# Patient Record
Sex: FEMALE | Race: WHITE | NOT HISPANIC OR LATINO
[De-identification: names, ages, dates, MRNs, and addresses within clinical notes are randomized per-mention and may not be internally consistent; named-entity substitution may affect disease eponyms.]

---

## 2019-07-31 PROBLEM — Z00.00 ENCOUNTER FOR PREVENTIVE HEALTH EXAMINATION: Status: ACTIVE | Noted: 2019-07-31

## 2019-08-05 ENCOUNTER — APPOINTMENT (OUTPATIENT)
Dept: PSYCHIATRY | Facility: CLINIC | Age: 59
End: 2019-08-05
Payer: MEDICARE

## 2019-08-05 PROCEDURE — 90792 PSYCH DIAG EVAL W/MED SRVCS: CPT

## 2019-08-22 ENCOUNTER — APPOINTMENT (OUTPATIENT)
Dept: PSYCHIATRY | Facility: CLINIC | Age: 59
End: 2019-08-22
Payer: MEDICARE

## 2019-08-22 PROCEDURE — 99215 OFFICE O/P EST HI 40 MIN: CPT

## 2019-09-25 ENCOUNTER — APPOINTMENT (OUTPATIENT)
Dept: PSYCHIATRY | Facility: CLINIC | Age: 59
End: 2019-09-25
Payer: MEDICARE

## 2019-09-25 PROCEDURE — 99215 OFFICE O/P EST HI 40 MIN: CPT

## 2019-11-01 ENCOUNTER — APPOINTMENT (OUTPATIENT)
Dept: PSYCHIATRY | Facility: CLINIC | Age: 59
End: 2019-11-01

## 2020-01-23 ENCOUNTER — FORM ENCOUNTER (OUTPATIENT)
Age: 60
End: 2020-01-23

## 2020-01-24 ENCOUNTER — APPOINTMENT (OUTPATIENT)
Dept: PSYCHIATRY | Facility: CLINIC | Age: 60
End: 2020-01-24

## 2020-02-25 ENCOUNTER — APPOINTMENT (OUTPATIENT)
Dept: PSYCHIATRY | Facility: CLINIC | Age: 60
End: 2020-02-25

## 2020-04-01 ENCOUNTER — OUTPATIENT (OUTPATIENT)
Dept: OUTPATIENT SERVICES | Facility: HOSPITAL | Age: 60
LOS: 1 days | Discharge: HOME | End: 2020-04-01
Payer: MEDICARE

## 2020-04-17 ENCOUNTER — APPOINTMENT (OUTPATIENT)
Dept: PSYCHIATRY | Facility: CLINIC | Age: 60
End: 2020-04-17

## 2020-04-23 ENCOUNTER — FORM ENCOUNTER (OUTPATIENT)
Age: 60
End: 2020-04-23

## 2020-04-24 ENCOUNTER — APPOINTMENT (OUTPATIENT)
Dept: PSYCHIATRY | Facility: CLINIC | Age: 60
End: 2020-04-24

## 2020-06-18 ENCOUNTER — FORM ENCOUNTER (OUTPATIENT)
Age: 60
End: 2020-06-18

## 2020-06-18 DIAGNOSIS — F20.9 SCHIZOPHRENIA, UNSPECIFIED: ICD-10-CM

## 2020-06-19 ENCOUNTER — APPOINTMENT (OUTPATIENT)
Dept: PSYCHIATRY | Facility: CLINIC | Age: 60
End: 2020-06-19

## 2020-06-19 PROCEDURE — 99214 OFFICE O/P EST MOD 30 MIN: CPT | Mod: 95

## 2020-07-05 ENCOUNTER — FORM ENCOUNTER (OUTPATIENT)
Age: 60
End: 2020-07-05

## 2020-07-06 ENCOUNTER — APPOINTMENT (OUTPATIENT)
Dept: PSYCHIATRY | Facility: CLINIC | Age: 60
End: 2020-07-06

## 2020-07-06 PROCEDURE — 99215 OFFICE O/P EST HI 40 MIN: CPT | Mod: 95

## 2020-07-07 ENCOUNTER — FORM ENCOUNTER (OUTPATIENT)
Age: 60
End: 2020-07-07

## 2020-07-08 ENCOUNTER — APPOINTMENT (OUTPATIENT)
Dept: PSYCHIATRY | Facility: CLINIC | Age: 60
End: 2020-07-08

## 2020-07-08 PROCEDURE — 99214 OFFICE O/P EST MOD 30 MIN: CPT | Mod: 95

## 2020-07-21 ENCOUNTER — FORM ENCOUNTER (OUTPATIENT)
Age: 60
End: 2020-07-21

## 2020-07-22 ENCOUNTER — APPOINTMENT (OUTPATIENT)
Dept: PSYCHIATRY | Facility: CLINIC | Age: 60
End: 2020-07-22

## 2020-07-22 PROCEDURE — 99214 OFFICE O/P EST MOD 30 MIN: CPT | Mod: 95

## 2020-08-06 ENCOUNTER — FORM ENCOUNTER (OUTPATIENT)
Age: 60
End: 2020-08-06

## 2020-08-07 ENCOUNTER — APPOINTMENT (OUTPATIENT)
Dept: PSYCHIATRY | Facility: CLINIC | Age: 60
End: 2020-08-07

## 2020-08-07 PROCEDURE — 99214 OFFICE O/P EST MOD 30 MIN: CPT | Mod: 95

## 2020-09-03 ENCOUNTER — FORM ENCOUNTER (OUTPATIENT)
Age: 60
End: 2020-09-03

## 2020-09-04 ENCOUNTER — APPOINTMENT (OUTPATIENT)
Dept: PSYCHIATRY | Facility: CLINIC | Age: 60
End: 2020-09-04

## 2020-09-04 PROCEDURE — 99214 OFFICE O/P EST MOD 30 MIN: CPT | Mod: 95

## 2020-09-13 ENCOUNTER — FORM ENCOUNTER (OUTPATIENT)
Age: 60
End: 2020-09-13

## 2020-09-14 ENCOUNTER — APPOINTMENT (OUTPATIENT)
Dept: PSYCHIATRY | Facility: CLINIC | Age: 60
End: 2020-09-14

## 2020-09-14 PROCEDURE — 90853 GROUP PSYCHOTHERAPY: CPT | Mod: 95

## 2020-09-20 ENCOUNTER — FORM ENCOUNTER (OUTPATIENT)
Age: 60
End: 2020-09-20

## 2020-09-21 ENCOUNTER — APPOINTMENT (OUTPATIENT)
Dept: PSYCHIATRY | Facility: CLINIC | Age: 60
End: 2020-09-21

## 2020-09-21 PROCEDURE — 90853 GROUP PSYCHOTHERAPY: CPT | Mod: 95

## 2020-09-28 ENCOUNTER — APPOINTMENT (OUTPATIENT)
Dept: PSYCHIATRY | Facility: CLINIC | Age: 60
End: 2020-09-28

## 2020-10-05 ENCOUNTER — APPOINTMENT (OUTPATIENT)
Dept: PSYCHIATRY | Facility: CLINIC | Age: 60
End: 2020-10-05

## 2020-10-05 ENCOUNTER — FORM ENCOUNTER (OUTPATIENT)
Age: 60
End: 2020-10-05

## 2020-10-06 ENCOUNTER — TRANSCRIPTION ENCOUNTER (OUTPATIENT)
Age: 60
End: 2020-10-06

## 2020-10-06 ENCOUNTER — APPOINTMENT (OUTPATIENT)
Dept: PSYCHIATRY | Facility: CLINIC | Age: 60
End: 2020-10-06

## 2020-10-06 PROCEDURE — 99215 OFFICE O/P EST HI 40 MIN: CPT | Mod: 95

## 2020-10-12 ENCOUNTER — APPOINTMENT (OUTPATIENT)
Dept: PSYCHIATRY | Facility: CLINIC | Age: 60
End: 2020-10-12

## 2020-10-19 ENCOUNTER — APPOINTMENT (OUTPATIENT)
Dept: PSYCHIATRY | Facility: CLINIC | Age: 60
End: 2020-10-19

## 2020-10-22 ENCOUNTER — NON-APPOINTMENT (OUTPATIENT)
Age: 60
End: 2020-10-22

## 2020-10-26 ENCOUNTER — APPOINTMENT (OUTPATIENT)
Dept: PSYCHIATRY | Facility: CLINIC | Age: 60
End: 2020-10-26

## 2020-11-02 ENCOUNTER — APPOINTMENT (OUTPATIENT)
Dept: PSYCHIATRY | Facility: CLINIC | Age: 60
End: 2020-11-02

## 2020-11-09 ENCOUNTER — APPOINTMENT (OUTPATIENT)
Dept: PSYCHIATRY | Facility: CLINIC | Age: 60
End: 2020-11-09

## 2020-11-15 ENCOUNTER — FORM ENCOUNTER (OUTPATIENT)
Age: 60
End: 2020-11-15

## 2020-11-16 ENCOUNTER — APPOINTMENT (OUTPATIENT)
Dept: PSYCHIATRY | Facility: CLINIC | Age: 60
End: 2020-11-16

## 2020-11-23 ENCOUNTER — APPOINTMENT (OUTPATIENT)
Dept: PSYCHIATRY | Facility: CLINIC | Age: 60
End: 2020-11-23

## 2020-11-30 ENCOUNTER — APPOINTMENT (OUTPATIENT)
Dept: PSYCHIATRY | Facility: CLINIC | Age: 60
End: 2020-11-30

## 2020-12-07 ENCOUNTER — APPOINTMENT (OUTPATIENT)
Dept: PSYCHIATRY | Facility: CLINIC | Age: 60
End: 2020-12-07

## 2020-12-10 ENCOUNTER — FORM ENCOUNTER (OUTPATIENT)
Age: 60
End: 2020-12-10

## 2020-12-13 ENCOUNTER — FORM ENCOUNTER (OUTPATIENT)
Age: 60
End: 2020-12-13

## 2020-12-14 ENCOUNTER — APPOINTMENT (OUTPATIENT)
Dept: PSYCHIATRY | Facility: CLINIC | Age: 60
End: 2020-12-14

## 2020-12-21 ENCOUNTER — APPOINTMENT (OUTPATIENT)
Dept: PSYCHIATRY | Facility: CLINIC | Age: 60
End: 2020-12-21

## 2020-12-28 ENCOUNTER — APPOINTMENT (OUTPATIENT)
Dept: PSYCHIATRY | Facility: CLINIC | Age: 60
End: 2020-12-28

## 2020-12-30 ENCOUNTER — NON-APPOINTMENT (OUTPATIENT)
Age: 60
End: 2020-12-30

## 2020-12-30 DIAGNOSIS — I38 ENDOCARDITIS, VALVE UNSPECIFIED: ICD-10-CM

## 2021-01-04 ENCOUNTER — APPOINTMENT (OUTPATIENT)
Dept: PSYCHIATRY | Facility: CLINIC | Age: 61
End: 2021-01-04

## 2021-01-11 ENCOUNTER — APPOINTMENT (OUTPATIENT)
Dept: PSYCHIATRY | Facility: CLINIC | Age: 61
End: 2021-01-11

## 2021-01-25 ENCOUNTER — APPOINTMENT (OUTPATIENT)
Dept: PSYCHIATRY | Facility: CLINIC | Age: 61
End: 2021-01-25

## 2021-02-09 ENCOUNTER — APPOINTMENT (OUTPATIENT)
Dept: PSYCHIATRY | Facility: CLINIC | Age: 61
End: 2021-02-09
Payer: MEDICARE

## 2021-02-09 ENCOUNTER — NON-APPOINTMENT (OUTPATIENT)
Age: 61
End: 2021-02-09

## 2021-02-09 PROCEDURE — 99214 OFFICE O/P EST MOD 30 MIN: CPT | Mod: 95

## 2021-02-09 RX ORDER — QUETIAPINE FUMARATE 200 MG/1
200 TABLET ORAL
Qty: 90 | Refills: 0 | Status: DISCONTINUED | COMMUNITY
Start: 2020-09-04

## 2021-02-09 RX ORDER — BACLOFEN 10 MG/1
10 TABLET ORAL 3 TIMES DAILY
Refills: 0 | Status: DISCONTINUED | COMMUNITY
End: 2021-02-09

## 2021-02-09 RX ORDER — LIDOCAINE AND PRILOCAINE 25; 25 MG/G; MG/G
2.5-2.5 CREAM TOPICAL
Qty: 30 | Refills: 0 | Status: DISCONTINUED | COMMUNITY
Start: 2020-11-09

## 2021-04-05 ENCOUNTER — APPOINTMENT (OUTPATIENT)
Dept: PSYCHIATRY | Facility: CLINIC | Age: 61
End: 2021-04-05
Payer: MEDICARE

## 2021-04-05 DIAGNOSIS — M43.6 TORTICOLLIS: ICD-10-CM

## 2021-04-05 DIAGNOSIS — G25.81 RESTLESS LEGS SYNDROME: ICD-10-CM

## 2021-04-05 PROCEDURE — 99214 OFFICE O/P EST MOD 30 MIN: CPT | Mod: 95

## 2021-04-05 RX ORDER — GABAPENTIN 100 MG/1
100 CAPSULE ORAL
Refills: 0 | Status: ACTIVE | COMMUNITY
Start: 2021-01-06

## 2021-06-01 ENCOUNTER — APPOINTMENT (OUTPATIENT)
Dept: PSYCHIATRY | Facility: CLINIC | Age: 61
End: 2021-06-01
Payer: MEDICARE

## 2021-06-01 VITALS — WEIGHT: 192 LBS | HEIGHT: 68 IN | BODY MASS INDEX: 29.1 KG/M2

## 2021-06-01 DIAGNOSIS — E03.9 HYPOTHYROIDISM, UNSPECIFIED: ICD-10-CM

## 2021-06-01 DIAGNOSIS — E78.5 HYPERLIPIDEMIA, UNSPECIFIED: ICD-10-CM

## 2021-06-01 PROCEDURE — 99214 OFFICE O/P EST MOD 30 MIN: CPT | Mod: 95

## 2021-06-01 RX ORDER — ESZOPICLONE 3 MG/1
3 TABLET, FILM COATED ORAL
Qty: 30 | Refills: 0 | Status: DISCONTINUED | COMMUNITY
Start: 2021-03-24

## 2021-06-01 RX ORDER — ROSUVASTATIN CALCIUM 20 MG/1
20 TABLET, FILM COATED ORAL
Refills: 0 | Status: ACTIVE | COMMUNITY
Start: 2020-08-19

## 2021-06-01 RX ORDER — ESZOPICLONE 2 MG/1
2 TABLET, FILM COATED ORAL
Qty: 30 | Refills: 0 | Status: DISCONTINUED | COMMUNITY
Start: 2021-03-05

## 2021-07-28 ENCOUNTER — APPOINTMENT (OUTPATIENT)
Dept: PSYCHIATRY | Facility: CLINIC | Age: 61
End: 2021-07-28
Payer: MEDICARE

## 2021-07-28 PROCEDURE — 99214 OFFICE O/P EST MOD 30 MIN: CPT | Mod: 95

## 2021-09-21 ENCOUNTER — APPOINTMENT (OUTPATIENT)
Dept: PSYCHIATRY | Facility: CLINIC | Age: 61
End: 2021-09-21
Payer: MEDICARE

## 2021-09-21 VITALS — WEIGHT: 196 LBS | BODY MASS INDEX: 29.8 KG/M2

## 2021-09-21 PROCEDURE — 99214 OFFICE O/P EST MOD 30 MIN: CPT | Mod: 95

## 2021-12-07 ENCOUNTER — APPOINTMENT (OUTPATIENT)
Dept: PSYCHIATRY | Facility: CLINIC | Age: 61
End: 2021-12-07

## 2022-01-04 ENCOUNTER — APPOINTMENT (OUTPATIENT)
Dept: PSYCHIATRY | Facility: CLINIC | Age: 62
End: 2022-01-04

## 2022-01-11 ENCOUNTER — APPOINTMENT (OUTPATIENT)
Dept: PSYCHIATRY | Facility: CLINIC | Age: 62
End: 2022-01-11
Payer: MEDICARE

## 2022-01-11 VITALS — BODY MASS INDEX: 29.86 KG/M2 | HEIGHT: 68 IN | WEIGHT: 197 LBS

## 2022-01-11 PROCEDURE — 99214 OFFICE O/P EST MOD 30 MIN: CPT | Mod: 95

## 2022-03-22 ENCOUNTER — APPOINTMENT (OUTPATIENT)
Dept: PSYCHIATRY | Facility: CLINIC | Age: 62
End: 2022-03-22
Payer: MEDICARE

## 2022-03-22 VITALS — HEIGHT: 68 IN | BODY MASS INDEX: 30.31 KG/M2 | WEIGHT: 200 LBS

## 2022-03-22 PROCEDURE — 99214 OFFICE O/P EST MOD 30 MIN: CPT | Mod: 95

## 2022-06-21 ENCOUNTER — APPOINTMENT (OUTPATIENT)
Dept: PSYCHIATRY | Facility: CLINIC | Age: 62
End: 2022-06-21
Payer: MEDICARE

## 2022-06-21 VITALS — WEIGHT: 195 LBS | BODY MASS INDEX: 29.55 KG/M2 | HEIGHT: 68 IN

## 2022-06-21 PROCEDURE — 99215 OFFICE O/P EST HI 40 MIN: CPT | Mod: 95

## 2022-06-28 ENCOUNTER — APPOINTMENT (OUTPATIENT)
Dept: PSYCHIATRY | Facility: CLINIC | Age: 62
End: 2022-06-28
Payer: MEDICARE

## 2022-06-28 PROCEDURE — 99215 OFFICE O/P EST HI 40 MIN: CPT | Mod: 95

## 2022-07-07 ENCOUNTER — APPOINTMENT (OUTPATIENT)
Dept: PSYCHIATRY | Facility: CLINIC | Age: 62
End: 2022-07-07

## 2022-07-07 PROCEDURE — 99214 OFFICE O/P EST MOD 30 MIN: CPT

## 2022-07-22 ENCOUNTER — APPOINTMENT (OUTPATIENT)
Dept: PSYCHIATRY | Facility: CLINIC | Age: 62
End: 2022-07-22

## 2022-08-11 ENCOUNTER — NON-APPOINTMENT (OUTPATIENT)
Age: 62
End: 2022-08-11

## 2022-08-11 ENCOUNTER — APPOINTMENT (OUTPATIENT)
Dept: PSYCHIATRY | Facility: CLINIC | Age: 62
End: 2022-08-11

## 2022-08-11 DIAGNOSIS — Z86.59 PERSONAL HISTORY OF OTHER MENTAL AND BEHAVIORAL DISORDERS: ICD-10-CM

## 2022-08-11 PROCEDURE — 99214 OFFICE O/P EST MOD 30 MIN: CPT | Mod: 95

## 2022-09-29 ENCOUNTER — APPOINTMENT (OUTPATIENT)
Dept: PSYCHIATRY | Facility: CLINIC | Age: 62
End: 2022-09-29

## 2022-09-29 VITALS — BODY MASS INDEX: 29.86 KG/M2 | WEIGHT: 197 LBS | HEIGHT: 68 IN

## 2022-09-29 PROCEDURE — 99214 OFFICE O/P EST MOD 30 MIN: CPT | Mod: 95

## 2022-10-27 ENCOUNTER — APPOINTMENT (OUTPATIENT)
Dept: PSYCHIATRY | Facility: CLINIC | Age: 62
End: 2022-10-27

## 2022-12-01 ENCOUNTER — APPOINTMENT (OUTPATIENT)
Dept: PSYCHIATRY | Facility: CLINIC | Age: 62
End: 2022-12-01

## 2022-12-01 VITALS — WEIGHT: 195 LBS | BODY MASS INDEX: 29.55 KG/M2 | HEIGHT: 68 IN

## 2022-12-01 PROCEDURE — 99215 OFFICE O/P EST HI 40 MIN: CPT | Mod: 95

## 2022-12-01 NOTE — PHYSICAL EXAM
[Dysarthria] : dysarthria [Constricted] : constricted [Normal] : good [Fair] : fair [FreeTextEntry1] : severe choreoathetosis in face, neck, shoulders, upper arms and hands. [FreeTextEntry8] : "Ok"

## 2022-12-01 NOTE — HISTORY OF PRESENT ILLNESS
[de-identified] : Past 8 weeks:  "A little bit better."  "No voices in a while."  Feels helped by weekly 1:1w DELROY Gasca via [facility].   Sporadically doing [facility] group on zoom.  No SI.  Met w PCP DO Renita Augustin on 11/28/22 - HA!C was higher but no changes made.  Also met w [facility] endo 11/28/22 and plan is for a "wt loss medicine."  \par  [No] : no

## 2022-12-01 NOTE — DISCUSSION/SUMMARY
[FreeTextEntry1] : 59 yo woman diagnosed with Schizophrenia 15 years ago, when she experienced auditory hallucinations, negative symptoms, and somatic delusions. At this time, she still experiences auditory hallucinations 1-2 times per day, but has worked on ignoring them so they don't cause as much distress. She recognizes her tendency to isolate, so she often makes sure she socializes and gets out of the house (e.g., going to concerts, attending an art group). While she no longer has living family, she does rely on a couple of close friends for emotional support. She denies suicidal and homicidal ideation.  She has good insight into her illness and access to support and coping strategies.  Dramatic reduction in abnormal movements - almost none remain - on higher dose of baclofen.  She is interested in possible ariprazole trial for metabolic benefits.  Sx increased 7/2020 and quetiapine dose inc from 400 to 500 has led to a resolution.  patient decreased quetiapine from 500 to 400 as well as q 2mos visits for financial reasons.  Insisting on qhs zolpidem despite my warnings re dependence.  Used her PCP to obtain these meds despite my asking her not to.  Rediscussed possibility of clozapine to possibly help movement d/o but pt not interested due to frequency of CBC.  We discussed TD meds but she has researched and too expensive.  I provided pt w patient handout from [facility] on treating dry mouth to help combat cavity formation.  I re-suggested clozapine but she is not interested despite possible benefit for TD.  Pt believes that her movement disorder is a reaction to not giving her attention to AH.  Does not think TD has been caused by anti-psychotic medications.\par \par Pt wants 90 day med supplies via mail order and we agreed to meet q 3 months w additional visits prn.\par \par I requested copies of 11/28/22 labs and pt will provide.\par \par Pt declines to come for q 6 mos AIMS as already has severe TD and does not want tx for the TD due to cost.  She has already seen a movement disorder specialist at [facility].\par  \par 12/1/22 BMI is 29.  Currently being considered for wt loss medicine by [facility] endo.  Pt was "encouraged to take quetiapine by a movement d/o specialist".  Was on risperidone for many years prior to this.  Possibly had a different AP when first dx.  Pt is open to a more wt neutral SGA but states needs to be a generic. Not interested in clozapine trial due to CBCs.  We had brief conversation re aripiprazole and pt wants to research this.\par \par \par Plan:\par continue  quetiapine 500 (incr 6/21/22).\par continue zolpidem 5 qhs - *** pt knows that she will not rcv extra meds if uses more than this dose ***\par consider switch to aripiprazole\par next AIMS declined by pt\par next labs 10/2022\par next BMI 12/2022\par next IAP 2/2023\par RT 3 months and prn

## 2022-12-01 NOTE — SOCIAL HISTORY
[FreeTextEntry1] : ***SocHx: completed her TREASURE at [facility].  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  Pt researched [facility] online - pt prefers to socialize with  people not-identified w mental illness.  Patient participates in various activities on own.  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  [No Known Substance Use] : no known substance use

## 2022-12-01 NOTE — RESULTS/DATA
[FreeTextEntry1] : LABS:\par 10/1/21 - ABNL:   WNL: CBC, CMP, lipids, HA1C 5.7;  TSH 0.67\par 1/8/20 -   ABNL:  TSH 4.57  Chol 213,  WNL: CBC, CMP, lipids, HA1C 5.7\par 6/21/19 -   ABNL:  none  WNL: lipid panel, T4, TSH 3.49.  \par 4/1/19 -   ABNL: HA1C 5.8;    WNL: CBC, CMP.    \par \par \par Wt & Vitals: \par wt 11/1/19 = 185 (5 yr range 190 - 205); vitals 11/1/19 - BP = 128/84   P =  80; wt 10/6/20 =192   BMI 10/6/20 = 29.2 (overwt)  (Ht 5'8")

## 2022-12-01 NOTE — REASON FOR VISIT
[Home] : at home, [unfilled] , at the time of the visit. [Medical Office: ([facility])___] : at the medical office located in  [Patient] : the patient [Self] : self [Follow-Up Visit] : a follow-up visit [FreeTextEntry1] : follow up for psychosis and movement disorder

## 2022-12-01 NOTE — PAST MEDICAL HISTORY
[FreeTextEntry1] : PPSyHx:  dx with Schizophrenia 15 years ago, when she experienced auditory hallucinations, negative symptoms, and somatic delusions. During that time she heard voices that said negative things about her. She briefly sought treatment at [facility], but within a few months started treatment at [facility] through their research program for Schizophrenia. There, she received treatment for 13 years where she engaged in individual therapy and medication management. She has never been psychiatrically hospitalized and has responded well to medications. She was previously on Risperidone and is currently taking Seroquel 400 mg. Around 2 years ago, she started to have symptoms of tardive dyskinesia, specifically related to involuntary movements with her neck. Her medication was switched in response to the tardive dyskinesia.  8/13/19 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  Recc  Tx for torticollis is botox.  Plan was to change from artane to baclofen.  Has AH approx 5 % of time she is awake.  "Rumbling under feet" x years.  Thinks tingling under feet and up legs may be a psychotic sx.  Has not talked to neurologists about tingling under feet.  Prior psychiatrist thought it was sx of psychosis.  Had rcvd clonazepam in past and not thought helpful (by her psychiatrist or patient) feeling like having a sz or for "panic attacks".  Psychiatrist [facility] quetiapine.  Pt has not had more than 400 mg/day. Seroquel 400 mg q hs (decr from 500 end of Jan 2021),  Had 2 month trial of metformin from PCP that did not help w wt loss.   Has dry mouth x "several years."  Has been on quetiapine for "2-3 years."  Pt discussed dry mouth and cavities w a dentist in the past.  was given a biotene oral rinse by dentist. \par  \par

## 2023-02-23 ENCOUNTER — APPOINTMENT (OUTPATIENT)
Dept: PSYCHIATRY | Facility: CLINIC | Age: 63
End: 2023-02-23
Payer: MEDICARE

## 2023-02-23 VITALS — WEIGHT: 201 LBS | BODY MASS INDEX: 30.46 KG/M2 | HEIGHT: 68 IN

## 2023-02-23 PROCEDURE — 99215 OFFICE O/P EST HI 40 MIN: CPT

## 2023-02-24 NOTE — SOCIAL HISTORY
[No Known Substance Use] : no known substance use [FreeTextEntry1] : ***SocHx: completed her TREASURE at [facility].  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  Pt researched [facility] online - pt prefers to socialize with  people not-identified w mental illness.  Patient participates in various activities on own.  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

## 2023-02-24 NOTE — SOCIAL HISTORY
[No Known Substance Use] : no known substance use [FreeTextEntry1] : ***SocHx: completed her TREASURE at [facility].  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  Pt researched [facility] online - pt prefers to socialize with  people not-identified w mental illness.  Patient participates in various activities on own.  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

## 2023-02-24 NOTE — HISTORY OF PRESENT ILLNESS
[No] : no [FreeTextEntry1] : Patient seen in person.  Past 12 weeks:  "A little bit better."  TD is "bothersome, body movement when I am trying to have a good time."   Feels helped by weekly 1:1w [facility] via [facility].   Sporadically doing [facility] group on zoom.  No SI.  Has not f/u w endo for wt loss treatment.  Colonoscopy was postponed.  Saw movement d/o specialist several weeks ago - botox was recc.  They discussed meds for TD but she declined partly due to cost.  She has a gyn appt next week - has not had x 15 yrs - "my PCP has been bugging me for years." [FreeTextEntry2] : PPSyHx:  dx with Schizophrenia 15 years ago, when she experienced auditory hallucinations, negative symptoms, and somatic delusions. During that time she heard voices that said negative things about her. She briefly sought treatment at [facility], but within a few months started treatment at [place] through their research program for Schizophrenia. There, she received treatment for 13 years where she engaged in individual therapy and medication management. She has never been psychiatrically hospitalized and has responded well to medications. She was previously on Risperidone and is currently taking Seroquel 400 mg. Around 2 years ago, she started to have symptoms of tardive dyskinesia, specifically related to involuntary movements with her neck. Her medication was switched in response to the tardive dyskinesia.  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  Recc  Tx for torticollis is botox.  Plan was to change from artane to baclofen.  Has AH approx 5 % of time she is awake.  "Rumbling under feet" x years.  Thinks tingling under feet and up legs may be a psychotic sx.  Has not talked to neurologists about tingling under feet.  Prior psychiatrist thought it was sx of psychosis.  Had rcvd clonazepam in past and not thought helpful (by her psychiatrist or patient) feeling like having a sz or for "panic attacks".  Psychiatrist [facility] quetiapine.  Pt has not had more than 400 mg/day. Seroquel 400 mg q hs (decr from 500 end of Jan 2021),  Had 2 month trial of metformin from PCP that did not help w wt loss.   Has dry mouth x "several years."  Has been on quetiapine for "2-3 years."  Pt discussed dry mouth and cavities w a dentist in the past.  was given a biotene oral rinse by dentist. \par  \par

## 2023-02-24 NOTE — REASON FOR VISIT
[Follow-Up Visit] : a follow-up visit [FreeTextEntry1] : follow up for psychosis and movement disorder

## 2023-02-24 NOTE — RESULTS/DATA
[FreeTextEntry1] : LABS:\par 11/28/22 WNL:  CBC, CMP, HA1C 5.9; TSH 2.84\par 10/1/21 - ABNL:   WNL: CBC, CMP, lipids, HA1C 5.7;  TSH 0.67\par 1/8/20 -   ABNL:  TSH 4.57  Chol 213,  WNL: CBC, CMP, lipids, HA1C 5.7\par 6/21/19 -   ABNL:  none  WNL: lipid panel, T4, TSH 3.49.  \par 4/1/19 -   ABNL: HA1C 5.8;    WNL: CBC, CMP.    \par \par \par Wt & Vitals: \par wt 11/1/19 = 185 (5 yr range 190 - 205); vitals 11/1/19 - BP = 128/84   P =  80; wt 10/6/20 =192   BMI 10/6/20 = 29.2 (overwt)  (Ht 5'8")

## 2023-02-24 NOTE — DISCUSSION/SUMMARY
[Plan Review] : Plan Review [Able to manage surrounding demands and opportunities] : able to manage surrounding demands and opportunities [Articulate] : articulate [Cognitively intact] : cognitively intact [Intelligent] : intelligent [Motivated to participate in treatment] : motivated to participate in treatment [Health literacy] : health literacy [Housing stability] : housing stability [High level of education] : high level of education [English fluency] : English fluency [Connected to healthcare] : connected to healthcare [Access to safe outdoor spaces] : access to safe outdoor spaces [Mental Health] : Mental Health [Continued - Progress made] : Continued - Progress made: [every ___ months] : every [unfilled] months [every ___ weeks] : every [unfilled] weeks [Treatment is no longer medically necessary as evidenced by:] : Treatment is no longer medically necessary as evidenced by: [Yes] : Yes [Psychiatric Provider/Prescriber] : Psychiatric Provider/Prescriber [FreeTextEntry2] : 8/2023 [FreeTextEntry3] : 8/5/2019 [FreeTextEntry5] : reduce physical sx that patient associates with having schizophrenia [FreeTextEntry8] : limited financial resoiurces [FreeTextEntry4] : "want force that  hovers over me to go away when I am watching TV" [de-identified] : pt feels that meds and thera[py have helped [de-identified] : no increase in this sensation [de-identified] : private therapist [de-identified] : no longer takes psych meds [FreeTextEntry1] : 61 yo woman diagnosed with Schizophrenia 15 years ago, when she experienced auditory hallucinations, negative symptoms, and somatic delusions. At this time, she still experiences auditory hallucinations 1-2 times per day, but has worked on ignoring them so they don't cause as much distress. She recognizes her tendency to isolate, so she often makes sure she socializes and gets out of the house (e.g., going to concerts, attending an art group). While she no longer has living family, she does rely on a couple of close friends for emotional support. She denies suicidal and homicidal ideation.  She has good insight into her illness and access to support and coping strategies.  Dramatic reduction in abnormal movements - almost none remain - on higher dose of baclofen.  She is interested in possible ariprazole trial for metabolic benefits.  Sx increased 7/2020 and quetiapine dose inc from 400 to 500 has led to a resolution.  patient decreased quetiapine from 500 to 400 as well as q 2mos visits for financial reasons.  Insisting on qhs zolpidem despite my warnings re dependence.  Used her PCP to obtain these meds despite my asking her not to.  Rediscussed possibility of clozapine to possibly help movement d/o but pt not interested due to frequency of CBC.  We discussed TD meds but she has researched and too expensive.  I provided pt w patient handout from [facility] on treating dry mouth to help combat cavity formation.  I re-suggested clozapine but she is not interested despite possible benefit for TD.  Pt believes that her movement disorder is a reaction to not giving her attention to AH.  Does not think TD has been caused by anti-psychotic medications.\par \par 11/28/22 labs reviewed - WNL:  CBC, CMP, HA1C 5.9; TSH 2.84\par \par 12/1/22 BMI is 29.  Currently being considered for wt loss medicine by [facility] endo.  Pt was "encouraged to take quetiapine by a movement d/o specialist".  Was on risperidone for many years prior to this.  Possibly had a different AP when first dx.  Pt is open to a more wt neutral SGA but states needs to be a generic. Not interested in clozapine trial due to CBCs.  We had brief conversation re aripiprazole and pt wants to research this.\par \par IAP review done\par \par Date of Last Physical Exam:  11/28/2022\par Date of Last Annual Labs:  11/28/2022\par Annual Review of Systems Completed (Y/N): to be done next visit\par Tobacco Screening Completed (Y/N):  2./23/2023\par \par If Patient is on an antipsychotic\par Date of Last AIMS: 2/23/2023\par Date of last BMI:  2/23/2023\par Date of Last HbgA1c:  11/28/2022\par Date of last Lipid Profile:  11/28/2022\par \par Plan:\par continue  quetiapine 500 (incr 6/21/22).\par continue zolpidem 5 qhs - *** pt knows that she will not rcv extra meds if uses more than this dose ***\par next AIMS 8/2023\par next labs 10/2023\par next BMI 5/2023\par next IAP 8/2023\par private weekly 1:1 w DELROY Gasca via [facility].  \par [facility] group on zoom.\par RT 3 months and prn

## 2023-02-24 NOTE — DISCUSSION/SUMMARY
[Plan Review] : Plan Review [Able to manage surrounding demands and opportunities] : able to manage surrounding demands and opportunities [Articulate] : articulate [Cognitively intact] : cognitively intact [Intelligent] : intelligent [Motivated to participate in treatment] : motivated to participate in treatment [Health literacy] : health literacy [Housing stability] : housing stability [High level of education] : high level of education [English fluency] : English fluency [Connected to healthcare] : connected to healthcare [Access to safe outdoor spaces] : access to safe outdoor spaces [Mental Health] : Mental Health [Continued - Progress made] : Continued - Progress made: [every ___ months] : every [unfilled] months [every ___ weeks] : every [unfilled] weeks [Treatment is no longer medically necessary as evidenced by:] : Treatment is no longer medically necessary as evidenced by: [Yes] : Yes [Psychiatric Provider/Prescriber] : Psychiatric Provider/Prescriber [FreeTextEntry2] : 8/2023 [FreeTextEntry3] : 8/5/2019 [FreeTextEntry5] : reduce physical sx that patient associates with having schizophrenia [FreeTextEntry8] : limited financial resoiurces [FreeTextEntry4] : "want force that  hovers over me to go away when I am watching TV" [de-identified] : pt feels that meds and thera[py have helped [de-identified] : no increase in this sensation [de-identified] : private therapist [de-identified] : no longer takes psych meds [FreeTextEntry1] : 61 yo woman diagnosed with Schizophrenia 15 years ago, when she experienced auditory hallucinations, negative symptoms, and somatic delusions. At this time, she still experiences auditory hallucinations 1-2 times per day, but has worked on ignoring them so they don't cause as much distress. She recognizes her tendency to isolate, so she often makes sure she socializes and gets out of the house (e.g., going to concerts, attending an art group). While she no longer has living family, she does rely on a couple of close friends for emotional support. She denies suicidal and homicidal ideation.  She has good insight into her illness and access to support and coping strategies.  Dramatic reduction in abnormal movements - almost none remain - on higher dose of baclofen.  She is interested in possible ariprazole trial for metabolic benefits.  Sx increased 7/2020 and quetiapine dose inc from 400 to 500 has led to a resolution.  patient decreased quetiapine from 500 to 400 as well as q 2mos visits for financial reasons.  Insisting on qhs zolpidem despite my warnings re dependence.  Used her PCP to obtain these meds despite my asking her not to.  Rediscussed possibility of clozapine to possibly help movement d/o but pt not interested due to frequency of CBC.  We discussed TD meds but she has researched and too expensive.  I provided pt w patient handout from [facility] on treating dry mouth to help combat cavity formation.  I re-suggested clozapine but she is not interested despite possible benefit for TD.  Pt believes that her movement disorder is a reaction to not giving her attention to AH.  Does not think TD has been caused by anti-psychotic medications.\par \par 11/28/22 labs reviewed - WNL:  CBC, CMP, HA1C 5.9; TSH 2.84\par \par 12/1/22 BMI is 29.  Currently being considered for wt loss medicine by [facility] endo.  Pt was "encouraged to take quetiapine by a movement d/o specialist".  Was on risperidone for many years prior to this.  Possibly had a different AP when first dx.  Pt is open to a more wt neutral SGA but states needs to be a generic. Not interested in clozapine trial due to CBCs.  We had brief conversation re aripiprazole and pt wants to research this.\par \par IAP review done\par \par Date of Last Physical Exam:  11/28/2022\par Date of Last Annual Labs:  11/28/2022\par Annual Review of Systems Completed (Y/N): to be done next visit\par Tobacco Screening Completed (Y/N):  2./23/2023\par \par If Patient is on an antipsychotic\par Date of Last AIMS: 2/23/2023\par Date of last BMI:  2/23/2023\par Date of Last HbgA1c:  11/28/2022\par Date of last Lipid Profile:  11/28/2022\par \par Plan:\par continue  quetiapine 500 (incr 6/21/22).\par continue zolpidem 5 qhs - *** pt knows that she will not rcv extra meds if uses more than this dose ***\par next AIMS 8/2023\par next labs 10/2023\par next BMI 5/2023\par next IAP 8/2023\par private weekly 1:1 w DELROY Gasca via [facility] [employer] [facility].  \par [facility] group on zoom.\par RT 3 months and prn

## 2023-02-24 NOTE — PAST MEDICAL HISTORY
[FreeTextEntry1] : 11/28/22 w PCP Bautista Copeland, DO - [facility]  "Mild heart valve problem",  partial thyroidectomy, had breast augmentation.  Had another movement d/o specialist at [facility] - Stefanie Lou.   Pt reports that she was told that serroquel is just as good as clozapine for movement.  Dr Lou increased baclofen from 5 to 10 mg.  Was told that botox is tx of choice.  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  dx w osteoarthritis.\par \par

## 2023-02-24 NOTE — PAST MEDICAL HISTORY
[FreeTextEntry1] : 11/28/22 w PCP Bautista Copeland, DO - [facility]  "Mild heart valve problem",  partial thyroidectomy, had breast augmentation.  Had another movement d/o specialist at [facility] - Stefanie Lou.   Pt reports that she was told that serroquel is just as good as clozapine for movement.  Dr Lou increased baclofen from 5 to 10 mg.  Was told that botox is tx of choice.  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  dx w osteoarthritis.\par \par

## 2023-02-24 NOTE — HISTORY OF PRESENT ILLNESS
[No] : no [FreeTextEntry1] : Patient seen in person.  Past 12 weeks:  "A little bit better."  TD is "bothersome, body movement when I am trying to have a good time."   Feels helped by weekly 1:1w [facility] via [facility] [employer] [facility].   Sporadically doing [facility] [employer] group on zoom.  No SI.  Has not f/u w endo for wt loss treatment.  Colonoscopy was postponed.  Saw movement d/o specialist several weeks ago - botox was recc.  They discussed meds for TD but she declined partly due to cost.  She has a gyn appt next week - has not had x 15 yrs - "my PCP has been bugging me for years." [FreeTextEntry2] : PPSyHx:  dx with Schizophrenia 15 years ago, when she experienced auditory hallucinations, negative symptoms, and somatic delusions. During that time she heard voices that said negative things about her. She briefly sought treatment [facility], but within a few months started treatment at [place] through their research program for Schizophrenia. There, she received treatment for 13 years where she engaged in individual therapy and medication management. She has never been psychiatrically hospitalized and has responded well to medications. She was previously on Risperidone and is currently taking Seroquel 400 mg. Around 2 years ago, she started to have symptoms of tardive dyskinesia, specifically related to involuntary movements with her neck. Her medication was switched in response to the tardive dyskinesia.  8/13/19 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  Recc  Tx for torticollis is botox.  Plan was to change from artane to baclofen.  Has AH approx 5 % of time she is awake.  "Rumbling under feet" x years.  Thinks tingling under feet and up legs may be a psychotic sx.  Has not talked to neurologists about tingling under feet.  Prior psychiatrist thought it was sx of psychosis.  Had rcvd clonazepam in past and not thought helpful (by her psychiatrist or patient) feeling like having a sz or for "panic attacks".  Psychiatrist [facility] quetiapine.  Pt has not had more than 400 mg/day. Seroquel 400 mg q hs (decr from 500 end of Jan 2021),  Had 2 month trial of metformin from PCP that did not help w wt loss.   Has dry mouth x "several years."  Has been on quetiapine for "2-3 years."  Pt discussed dry mouth and cavities w a dentist in the past.  was given a biotene oral rinse by dentist. \par  \par

## 2023-02-24 NOTE — PHYSICAL EXAM
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 [FreeTextEntry8] : "Ok"

## 2023-06-01 ENCOUNTER — OUTPATIENT (OUTPATIENT)
Dept: OUTPATIENT SERVICES | Facility: HOSPITAL | Age: 63
LOS: 1 days | Discharge: HOME | End: 2023-06-01

## 2023-06-01 ENCOUNTER — APPOINTMENT (OUTPATIENT)
Dept: PSYCHIATRY | Facility: CLINIC | Age: 63
End: 2023-06-01
Payer: MEDICARE

## 2023-06-01 VITALS — WEIGHT: 200 LBS | HEIGHT: 68 IN | BODY MASS INDEX: 30.31 KG/M2

## 2023-06-01 PROCEDURE — 99215 OFFICE O/P EST HI 40 MIN: CPT | Mod: 95

## 2023-06-01 NOTE — SOCIAL HISTORY
[No Known Substance Use] : no known substance use [FreeTextEntry1] : ***SocHx: completed her TREASURE at [facility].  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  Pt researched [facility] online - pt prefers to socialize with  people not-identified w mental illness.  Patient participates in various activities on own.  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

## 2023-06-01 NOTE — PAST MEDICAL HISTORY
[FreeTextEntry1] : 11/28/22 w PCP Bautista Copeland,  - [facility]  "Mild heart valve problem",  partial thyroidectomy, had breast augmentation.  Had another movement d/o specialist at [facility] - Stefanie Lou.   Pt reports that she was told that serroquel is just as good as clozapine for movement.  Dr Lou increased baclofen from 5 to 10 mg.  Was told that botox is tx of choice.  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  dx w osteoarthritis.   Saw movement d/o specialist 2/2023  - botox was recc.  They discussed meds for TD but she declined partly due to cost.   Had not had GYN  x 15 yrs - "my PCP has been bugging me for years."\par \par

## 2023-06-01 NOTE — PHYSICAL EXAM
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 [FreeTextEntry8] : "Ok"

## 2023-06-01 NOTE — REASON FOR VISIT
[Telehealth (audio & video) - Individual/Group] : This visit was provided via telehealth using real-time 2-way audio visual technology. [Medical Office: ([facility])___] : The provider was located at the medical office in [unfilled]. [Home] : The patient, [unfilled], was located at home, [unfilled], at the time of the visit. [Verbal consent obtained from patient/other participant(s)] : Verbal consent for telehealth/telephonic services obtained from patient/other participant(s) [Follow-Up Visit] : a follow-up visit [FreeTextEntry2] : 2/23/2023 [FreeTextEntry1] : follow up for psychosis and movement disorder

## 2023-06-01 NOTE — RESULTS/DATA
[FreeTextEntry1] : \par 11/28/22 WNL:  CBC, CMP, HA1C 5.9; TSH 2.84\par 10/1/21 - ABNL:   WNL: CBC, CMP, lipids, HA1C 5.7;  TSH 0.67\par 1/8/20 -   ABNL:  TSH 4.57  Chol 213,  WNL: CBC, CMP, lipids, HA1C 5.7\par 6/21/19 -   ABNL:  none  WNL: lipid panel, T4, TSH 3.49.  \par 4/1/19 -   ABNL: HA1C 5.8;    WNL: CBC, CMP.    \par \par \par Wt & Vitals: \par wt 11/1/19 = 185 (5 yr range 190 - 205); vitals 11/1/19 - BP = 128/84   P =  80; wt 10/6/20 =192   BMI 10/6/20 = 29.2 (overwt)  (Ht 5'8")

## 2023-06-01 NOTE — HISTORY OF PRESENT ILLNESS
[No] : no [FreeTextEntry1] : Past 12 weeks:  "Ok. A little bit better."  Saw a new movement disorder specialist 5/2/2023 as wanted to try something other than botox - [facility] - Sonal Acosta MD - rx  clonazepam 0.5 mg q am.  It has been helpful  for TD.  Had a colonoscopy and there was a finding that was followed up w an MRI - a cyst on ovary was found and an US is needed.  Is being tx by a GYN.  Feels helped by weekly 1:1w DELROY Gasca via [facility].   Sporadically doing [facility] group on zoom.  No SI.  f/u w endo 7/10 for wt loss treatment.   [FreeTextEntry2] : PPSyHx:  dx with Schizophrenia 15 years ago, when she experienced auditory hallucinations, negative symptoms, and somatic delusions. During that time she heard voices that said negative things about her. She briefly sought treatment at [facility], but within a few months started treatment at [place] through their research program for Schizophrenia. There, she received treatment for 13 years where she engaged in individual therapy and medication management. She has never been psychiatrically hospitalized and has responded well to medications. She was previously on Risperidone and is currently taking Seroquel 400 mg. Around 2 years ago, she started to have symptoms of tardive dyskinesia, specifically related to involuntary movements with her neck. Her medication was switched in response to the tardive dyskinesia.  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  Recc  Tx for torticollis is botox.  Plan was to change from artane to baclofen.  Has AH approx 5 % of time she is awake.  "Rumbling under feet" x years.  Thinks tingling under feet and up legs may be a psychotic sx.  Has not talked to neurologists about tingling under feet.  Prior psychiatrist thought it was sx of psychosis.  Had rcvd clonazepam in past and not thought helpful (by her psychiatrist or patient) feeling like having a sz or for "panic attacks".  Psychiatrist [facility] quetiapine.  Pt has not had more than 400 mg/day. Seroquel 400 mg q hs (decr from 500 end of Jan 2021),  Had 2 month trial of metformin from PCP that did not help w wt loss.   Has dry mouth x "several years."  Has been on quetiapine for "2-3 years."  Pt discussed dry mouth and cavities w a dentist in the past.  was given a biotene oral rinse by dentist. \par  \par

## 2023-06-01 NOTE — DISCUSSION/SUMMARY
[FreeTextEntry1] : 59 yo woman diagnosed with Schizophrenia 15 years ago, when she experienced auditory hallucinations, negative symptoms, and somatic delusions. At this time, she still experiences auditory hallucinations 1-2 times per day, but has worked on ignoring them so they don't cause as much distress. She recognizes her tendency to isolate, so she often makes sure she socializes and gets out of the house (e.g., going to concerts, attending an art group). While she no longer has living family, she does rely on a couple of close friends for emotional support. She denies suicidal and homicidal ideation.  She has good insight into her illness and access to support and coping strategies.  Dramatic reduction in abnormal movements - almost none remain - on higher dose of baclofen.  She is interested in possible ariprazole trial for metabolic benefits.  Sx increased 7/2020 and quetiapine dose inc from 400 to 500 has led to a resolution.  patient decreased quetiapine from 500 to 400 as well as q 2mos visits for financial reasons.  Insisting on qhs zolpidem despite my warnings re dependence.  Used her PCP to obtain these meds despite my asking her not to.  Rediscussed possibility of clozapine to possibly help movement d/o but pt not interested due to frequency of CBC.  We discussed TD meds but she has researched and too expensive.  I provided pt w patient handout from [facility] on treating dry mouth to help combat cavity formation.  I re-suggested clozapine but she is not interested despite possible benefit for TD.  Pt believes that her movement disorder is a reaction to not giving her attention to AH.  Does not think TD has been caused by anti-psychotic medications.\par \par I recc that pt contact GYN re the blood test she says she never got result.  I asked pt to contact me re results of US of ovarian cyst and she said she will do this.  \par \par \par 12/1/22 BMI is 29.  Currently being considered for wt loss medicine by [facility] sasha.  Pt was "encouraged to take quetiapine by a movement d/o specialist".  Was on risperidone for many years prior to this.  Possibly had a different AP when first dx.  Pt is open to a more wt neutral SGA but states needs to be a generic. Not interested in clozapine trial due to CBCs.  We had brief conversation re aripiprazole and pt wants to research this.\par \par \par Date of Last Physical Exam:  11/28/2022\par Date of Last Annual Labs:  11/28/2022\par Annual Review of Systems Completed (Y/N): deferred\par Tobacco Screening Completed (Y/N):  2./23/2023\par \par If Patient is on an antipsychotic\par Date of Last AIMS: 2/23/2023\par Date of last BMI:  2/23/2023\par Date of Last HbgA1c:  11/28/2022\par Date of last Lipid Profile:  11/28/2022\par \par Plan:\par continue  quetiapine 500 (incr 6/21/22).\par continue zolpidem 5 qhs  (seen in person 2/23/2023)\par  *** pt knows that she will not rcv extra meds if uses more than this dose ***\par clonazepam 0.5 mg q am is rx by [facility] movement disorder specialist Sonal Acosta MD\par next AIMS 2/2024 (pt declines q 6 mos AIMS as sees movement disorder doctors)\par next BMI 8/2023\par next labs 10/2023\par next IAP 2/2024\par private weekly 1:1 w DELROY Gasca via [facility].  \par [facility] group on zoom.\par RT 3 months and prn

## 2023-07-10 DIAGNOSIS — F43.23 ADJUSTMENT DISORDER WITH MIXED ANXIETY AND DEPRESSED MOOD: ICD-10-CM

## 2023-08-18 ENCOUNTER — APPOINTMENT (OUTPATIENT)
Dept: PSYCHIATRY | Facility: CLINIC | Age: 63
End: 2023-08-18
Payer: MEDICARE

## 2023-08-18 VITALS — BODY MASS INDEX: 31.22 KG/M2 | HEIGHT: 68 IN | WEIGHT: 206 LBS

## 2023-08-18 PROCEDURE — 99214 OFFICE O/P EST MOD 30 MIN: CPT | Mod: 95

## 2023-08-18 RX ORDER — CLONAZEPAM 0.5 MG/1
0.5 TABLET ORAL
Refills: 0 | Status: ACTIVE | COMMUNITY

## 2023-08-18 RX ORDER — METFORMIN HYDROCHLORIDE 500 MG/1
500 TABLET, COATED ORAL TWICE DAILY
Refills: 0 | Status: ACTIVE | COMMUNITY

## 2023-08-18 NOTE — REASON FOR VISIT
[Telehealth (audio & video) - Individual/Group] : This visit was provided via telehealth using real-time 2-way audio visual technology. [Medical Office: ([facility])___] : The provider was located at the medical office in [unfilled]. [Home] : The patient, [unfilled], was located at home, [unfilled], at the time of the visit. [Verbal consent obtained from patient/other participant(s)] : Verbal consent for telehealth/telephonic services obtained from patient/other participant(s) [Follow-Up Visit] : a follow-up visit [FreeTextEntry2] : 2/23/2023 [FreeTextEntry1] : follow up for psychosis and movement disorder

## 2023-08-18 NOTE — SOCIAL HISTORY
[No Known Substance Use] : no known substance use [FreeTextEntry1] : ***SocHx: completed her TREASURE at [facility].  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  Pt researched [facility] online - pt prefers to socialize with  people not-identified w mental illness.  Patient participates in various activities on own.  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

## 2023-08-18 NOTE — HISTORY OF PRESENT ILLNESS
[No] : no [FreeTextEntry1] : Past 12 weeks:  "pretty good."   clonazepam helpful  for TD.  Has had some increased difficulty falling asleep and requesting increse in zolpidem dose.  cyst on ovary was ok on US but will be followed w repeat US.   Feels helped by weekly 1:1w DELROY Gasca via [facility].   Sporadically doing [facility] group on zoom.  No SI. endo 7/10 started metformin as ozempic not available via pt insurance. [FreeTextEntry2] : PPSyHx:  dx with Schizophrenia 15 years ago, when she experienced auditory hallucinations, negative symptoms, and somatic delusions. During that time she heard voices that said negative things about her. She briefly sought treatment at [facility], but within a few months started treatment at [place] through their research program for Schizophrenia. There, she received treatment for 13 years where she engaged in individual therapy and medication management. She has never been psychiatrically hospitalized and has responded well to medications. She was previously on Risperidone and is currently taking Seroquel 400 mg. Around 2 years ago, she started to have symptoms of tardive dyskinesia, specifically related to involuntary movements with her neck. Her medication was switched in response to the tardive dyskinesia.  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  Recc  Tx for torticollis is botox.  Plan was to change from artane to baclofen.  Has AH approx 5 % of time she is awake.  "Rumbling under feet" x years.  Thinks tingling under feet and up legs may be a psychotic sx.  Has not talked to neurologists about tingling under feet.  Prior psychiatrist thought it was sx of psychosis.  Had rcvd clonazepam in past and not thought helpful (by her psychiatrist or patient) feeling like having a sz or for "panic attacks".  Psychiatrist [facility] quetiapine.  Pt has not had more than 400 mg/day. Seroquel 400 mg q hs (decr from 500 end of Jan 2021),  Had 2 month trial of metformin from PCP that did not help w wt loss.   Has dry mouth x "several years."  Has been on quetiapine for "2-3 years."  Pt discussed dry mouth and cavities w a dentist in the past.  was given a biotene oral rinse by dentist.  Saw a new movement disorder specialist 5/2/2023 as wanted to try something other than botox - [facility] - Sonal Acosta MD - rx  clonazepam 0.5 mg q am.

## 2023-08-18 NOTE — PAST MEDICAL HISTORY
[FreeTextEntry1] : 11/28/22 w PCP Bautista Copeland,  - [facility]  "Mild heart valve problem",  partial thyroidectomy, had breast augmentation.  Had another movement d/o specialist at [facility] - Stefanie Lou.   Pt reports that she was told that serroquel is just as good as clozapine for movement.  Dr Lou increased baclofen from 5 to 10 mg.  Was told that botox is tx of choice.  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  dx w osteoarthritis.   Saw movement d/o specialist 2/2023  - botox was recc.  They discussed meds for TD but she declined partly due to cost.   Had not had GYN  x 15 yrs - "my PCP has been bugging me for years."\par  \par

## 2023-08-18 NOTE — DISCUSSION/SUMMARY
[FreeTextEntry1] : 64 yo woman diagnosed with Schizophrenia 15 years ago, when she experienced auditory hallucinations, negative symptoms, and somatic delusions. At this time, she still experiences auditory hallucinations 1-2 times per day, but has worked on ignoring them so they don't cause as much distress. She recognizes her tendency to isolate, so she often makes sure she socializes and gets out of the house (e.g., going to concerts, attending an art group). While she no longer has living family, she does rely on a couple of close friends for emotional support. She denies suicidal and homicidal ideation.  She has good insight into her illness and access to support and coping strategies.  Dramatic reduction in abnormal movements - almost none remain - on higher dose of baclofen.  She is interested in possible ariprazole trial for metabolic benefits.  Sx increased 7/2020 and quetiapine dose inc from 400 to 500 has led to a resolution.  patient decreased quetiapine from 500 to 400 as well as q 2mos visits for financial reasons.  Insisting on qhs zolpidem despite my warnings re dependence.  Used her PCP to obtain these meds despite my asking her not to.  Rediscussed possibility of clozapine to possibly help movement d/o but pt not interested due to frequency of CBC.  We discussed TD meds but she has researched and too expensive.  I provided pt w patient handout from [facility] on treating dry mouth to help combat cavity formation.  I re-suggested clozapine but she is not interested despite possible benefit for TD.  Pt believes that her movement disorder is a reaction to not giving her attention to AH.  Does not think TD has been caused by anti-psychotic medications.  Discussed use of sleep techniques as alternative to increased use hypnotics.  Provided and reviewed handout.  Will plan to restart conversation around change of SGA.   12/1/22 BMI is 29.  Currently being considered for wt loss medicine by [facility] endo.  Pt was "encouraged to take quetiapine by a movement d/o specialist".  Was on risperidone for many years prior to this.  Possibly had a different AP when first dx.  Pt is open to a more wt neutral SGA but states needs to be a generic. Not interested in clozapine trial due to CBCs.  We had brief conversation re aripiprazole and pt wants to research this.   Date of Last Physical Exam:  11/28/2022 Date of Last Annual Labs:  11/28/2022 Annual Review of Systems Completed (Y/N): deferred Tobacco Screening Completed (Y/N):  2./23/2023  If Patient is on an antipsychotic Date of Last AIMS: 2/23/2023 Date of last BMI:  8/18/2023 Date of Last HbgA1c:  11/28/2022 Date of last Lipid Profile:  11/28/2022  Plan: continue  quetiapine 500 (incr 6/21/22). continue zolpidem 5 qhs  (seen in person 2/23/2023)  *** pt knows that she will not rcv extra meds if uses more than this dose *** clonazepam 0.5 mg q am is rx by [facility] movement disorder specialist Sonal Acosta MD next AIMS 2/2024 (pt declines q 6 mos AIMS as sees movement disorder doctors) next BMI 8/2023 next labs 10/2023 next IAP 2/2024 private weekly 1:1 w DELROY Gasca via [facility].   [facility] group on zoom. RT 3 months and prn

## 2023-11-01 ENCOUNTER — OUTPATIENT (OUTPATIENT)
Dept: OUTPATIENT SERVICES | Facility: HOSPITAL | Age: 63
LOS: 1 days | Discharge: HOME | End: 2023-11-01

## 2023-11-03 ENCOUNTER — APPOINTMENT (OUTPATIENT)
Dept: PSYCHIATRY | Facility: CLINIC | Age: 63
End: 2023-11-03
Payer: MEDICARE

## 2023-11-03 VITALS — BODY MASS INDEX: 31.52 KG/M2 | HEIGHT: 68 IN | WEIGHT: 208 LBS

## 2023-11-03 PROCEDURE — 99214 OFFICE O/P EST MOD 30 MIN: CPT | Mod: 95

## 2023-11-06 ENCOUNTER — APPOINTMENT (OUTPATIENT)
Dept: PSYCHIATRY | Facility: CLINIC | Age: 63
End: 2023-11-06
Payer: MEDICARE

## 2023-11-06 PROCEDURE — 99215 OFFICE O/P EST HI 40 MIN: CPT | Mod: 95

## 2023-11-06 RX ORDER — AMOXICILLIN 500 MG/1
500 TABLET, FILM COATED ORAL
Qty: 21 | Refills: 0 | Status: DISCONTINUED | COMMUNITY
Start: 2023-08-29

## 2023-12-12 DIAGNOSIS — F43.23 ADJUSTMENT DISORDER WITH MIXED ANXIETY AND DEPRESSED MOOD: ICD-10-CM

## 2024-01-26 ENCOUNTER — APPOINTMENT (OUTPATIENT)
Dept: PSYCHIATRY | Facility: CLINIC | Age: 64
End: 2024-01-26
Payer: MEDICARE

## 2024-01-26 ENCOUNTER — NON-APPOINTMENT (OUTPATIENT)
Age: 64
End: 2024-01-26

## 2024-01-26 VITALS — WEIGHT: 200 LBS | HEIGHT: 68 IN | BODY MASS INDEX: 30.31 KG/M2

## 2024-01-26 DIAGNOSIS — G24.01 DRUG INDUCED SUBACUTE DYSKINESIA: ICD-10-CM

## 2024-01-26 PROCEDURE — 99215 OFFICE O/P EST HI 40 MIN: CPT

## 2024-01-26 RX ORDER — LEVOTHYROXINE SODIUM 112 UG/1
112 TABLET ORAL
Refills: 0 | Status: ACTIVE | COMMUNITY

## 2024-01-26 NOTE — SOCIAL HISTORY
[No Known Substance Use] : no known substance use [FreeTextEntry1] : ***SocHx: completed her TREASURE at [facility].  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  Pt researched [facility] online - pt prefers to socialize with  people not-identified w mental illness.  Patient participates in various activities on own.  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

## 2024-01-26 NOTE — HISTORY OF PRESENT ILLNESS
[No] : no [FreeTextEntry1] : Past 3 months:  "A little stressful.  Pt ended therapy - "I did not trust her anymore."  Just started therapy at [facility] with a therapist who does CBT-P.  Pt decided to continue tx w me for psychopharm.  "I am ok."  Using blue light blocking glasses - less TV before bed. Does not look at clock.   Uses zolpidem every night.  Still Takes gabapentin 200 mg in daniel for hand pain.  It is rx bu neuro.   Has appt w hand specialist pending.  clonazepam 0.5 q am still helpful  for TD.   Goes to [employer] 1-2x/month.  No SI.   Had Endo appt - now on ozempic and has lost 10 lbs.  Saw PCP for Annual PE 12/2023 - statin and synthroid doses were increased.   [FreeTextEntry2] : Dx with Schizophrenia 15 years ago, when she experienced auditory hallucinations, negative symptoms, and somatic delusions. During that time she heard voices that said negative things about her. She briefly sought treatment at [facility], but within a few months started treatment at [place] through their research program for Schizophrenia. There, she received treatment for 13 years where she engaged in individual therapy and medication management. She has never been psychiatrically hospitalized and has responded well to medications. She was previously on Risperidone . Around 2018 she started to have symptoms of tardive dyskinesia, specifically related to involuntary movements with her neck. Her medication was switched in response to the tardive dyskinesia.  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  Plan was to change from artane to baclofen.  Has AH approx 5 % of time she is awake.  "Rumbling under feet" x years.  Thinks tingling under feet and up legs may be a psychotic sx.  Has not talked to neurologists about tingling under feet.  Prior psychiatrist thought it was sx of psychosis.  Had 2 month trial of metformin from PCP that did not help w wt loss.   Has dry mouth x "several years."  Pt discussed dry mouth and cavities w a dentist in the past.  was given a biotene oral rinse by dentist.  Saw a new movement disorder specialist 5/2/2023 as wanted to try something other than botox - PAMELA Acosta MD - rx  clonazepam 0.5 mg q am.     [FreeTextEntry3] : [facility] intake 8/5/2019.

## 2024-01-26 NOTE — PHYSICAL EXAM
[Dysarthria] : dysarthria [Constricted] : constricted [Normal] : good [Fair] : fair [0] : 0 [3] : 3 [2] : 2 [No] : No [FreeTextEntry8] : "Ok" [FreeTextEntry1] : 6

## 2024-01-26 NOTE — PHYSICAL EXAM
[1 - Monthly or less] : 1 - Monthly or less [0 - 1 or 2] : 0 - 1 or 2 [0 - Never] : 0 - Never [0 - No] : 0 - No [3] : 3 [2] : 2 [5] : 5 [4] : 4 [] : No [Individual reports tobacco use during the last 30 days?] : Individual reports tobacco use during the last 30 days? No [FreeTextEntry1] : 1 [SchwartzScore] : 41

## 2024-01-26 NOTE — REASON FOR VISIT
[Medical Office: ([facility])___] : The provider was located at the medical office in [unfilled]. [Telehealth (audio & video) - Individual/Group] : This visit was provided via telehealth using real-time 2-way audio visual technology. [Verbal consent obtained from patient/other participant(s)] : Verbal consent for telehealth/telephonic services obtained from patient/other participant(s) [Home] : The patient, [unfilled], was located at home, [unfilled], at the time of the visit. [Follow-Up Visit] : a follow-up visit [FreeTextEntry2] : 2/23/2023 [FreeTextEntry1] : follow up for psychosis and movement disorder

## 2024-01-26 NOTE — DISCUSSION/SUMMARY
[FreeTextEntry1] : 62 yo woman diagnosed with Schizophrenia 15 years ago, when she experienced auditory hallucinations, negative symptoms, and somatic delusions. At this time, she still experiences auditory hallucinations 1-2 times per day, but has worked on ignoring them so they don't cause as much distress. She recognizes her tendency to isolate, so she often makes sure she socializes and gets out of the house (e.g., going to concerts, attending an art group). While she no longer has living family, she does rely on a couple of close friends for emotional support. She denies suicidal and homicidal ideation.  She has good insight into her illness and access to support and coping strategies.  Dramatic reduction in abnormal movements - almost none remain - on higher dose of baclofen.  She is interested in possible ariprazole trial for metabolic benefits.  Sx increased 7/2020 and quetiapine dose inc from 400 to 500 has led to a resolution.  patient decreased quetiapine from 500 to 400 as well as q 2mos visits for financial reasons.  Insisting on qhs zolpidem despite my warnings re dependence.  Used her PCP to obtain these meds despite my asking her not to.  Rediscussed possibility of clozapine to possibly help movement d/o but pt not interested due to frequency of CBC.  We discussed TD meds but she has researched and too expensive.  I provided pt w patient handout from S&P on treating dry mouth to help combat cavity formation.  I re-suggested clozapine but she is not interested despite possible benefit for TD.  Pt believes that her movement disorder is a reaction to not giving her attention to AH.  Does not think TD has been caused by anti-psychotic medications.  IAP done  AIMS done = 6  deferred - Will plan to restart conversation around change of SGA. This is a difficult issue as pt's TD is best managed by quetiapine or clozapine as confirmed by movement d/o specialist.   Patients with BMI = >30 on one or more Antipsychotic Meds: 1. Discussed with patient that BMI meets criteria for obesity - 11/3/23 2. Provided psycho education regarding risks associated with obesity includes metabolic syndrome. - 11/3/23 3. Reviewed trend of BMI with patient 11/3/23 4. Made recommendations for intervention to promote weight loss - ozempic [ ] Encouraged physical activity, healthy diet, offered a referral for a nutritionist [ ] Offered med options to mitigate risk of further weight gain and to promote weight loss.  Pt was "encouraged to take quetiapine by a movement d/o specialist".  Was on risperidone for many years prior to this.  Possibly had a different AP when first dx.  Pt is open to a more wt neutral SGA but states needs to be a generic. Not interested in clozapine trial due to CBCs.  We had brief conversation re aripiprazole and pt wants to research this.   Date of Last Physical Exam:  12/2023 -  PCP Bautista Copeland DO - [facility]  Date of Last Annual Labs:  11/2023 - pt will send copies Annual Review of Systems Completed (Y/N): deferred Tobacco Screening Completed (Y/N):  2./23/2023  If Patient is on an antipsychotic Date of Last AIMS: 1/26/2024 Date of last BMI:   1/26/2024 Date of Last HbgA1c:   11/2023 - pt will send copies Date of last Lipid Profile:   11/2023 - pt will send copies  Plan: continue  quetiapine 500 (incr 6/21/22). continue zolpidem 5 qhs  (seen in person 2/23/2023)  *** pt knows that she will not rcv extra meds if uses more than this dose *** clonazepam 0.5 mg q am is rx by [facility] movement disorder specialist Sonal Acosta MD next AIMS 6/2024 (pt declines q 6 mos AIMS as sees movement disorder doctors regularly) next BMI 5/2024  next labs 11/2024 next IAP 2/2025 weekly 1:1 w Filipe Braswell NP at [facility] clinic  [facility] RT 3 months and prn

## 2024-01-26 NOTE — PAST MEDICAL HISTORY
[FreeTextEntry1] : 11/28/22 w PCP Bautista Copeland DO - [facility]  "Mild heart valve problem",  partial thyroidectomy, had breast augmentation.  Had another movement d/o specialist at [facility] - Stefanie Lou.   Pt reports that she was told that serroquel is just as good as clozapine for movement.  Was told that botox is tx of choice but did not help. . ulceraterative proctitis  and used a suppository.  dx w osteoarthritis.   Saw movement d/o specialist 2/2023  -  Sonal Acosta MD - [facility].  Clonazepam has been helpful for TD.

## 2024-01-26 NOTE — DISCUSSION/SUMMARY
[Plan Review] : Plan Review [Able to manage surrounding demands and opportunities] : able to manage surrounding demands and opportunities [Articulate] : articulate [Cognitively intact] : cognitively intact [Intelligent] : intelligent [Motivated to participate in treatment] : motivated to participate in treatment [Health literacy] : health literacy [Housing stability] : housing stability [High level of education] : high level of education [English fluency] : English fluency [Connected to healthcare] : connected to healthcare [Access to safe outdoor spaces] : access to safe outdoor spaces [FreeTextEntry1] : 2/2024 [FreeTextEntry3] : 8/5/2019 [FreeTextEntry2] : 2/2025 [FreeTextEntry5] : maintain improvements in physical sx that patient associates with having schizophrenia [FreeTextEntry8] : limited financial resoiurces [Mental Health] : Mental Health [Continued - Progress made] : Continued - Progress made: [every ___ months] : every [unfilled] months [every ___ weeks] : every [unfilled] weeks [Treatment is no longer medically necessary as evidenced by:] : Treatment is no longer medically necessary as evidenced by: [Yes] : Yes [Psychiatric Provider/Prescriber] : Psychiatric Provider/Prescriber [FreeTextEntry4] : "want force that  hovers over me to go away when I am watching TV" [de-identified] : pt feels that meds and thera[py have helped [de-identified] : no increase in this sensation [de-identified] : therapist at [facility] [de-identified] : no longer takes psych meds

## 2024-03-08 DIAGNOSIS — F20.9 SCHIZOPHRENIA, UNSPECIFIED: ICD-10-CM

## 2024-05-03 ENCOUNTER — APPOINTMENT (OUTPATIENT)
Dept: PSYCHIATRY | Facility: CLINIC | Age: 64
End: 2024-05-03
Payer: MEDICARE

## 2024-05-03 VITALS — BODY MASS INDEX: 29.55 KG/M2 | WEIGHT: 195 LBS | HEIGHT: 68 IN

## 2024-05-03 DIAGNOSIS — F20.9 SCHIZOPHRENIA, UNSPECIFIED: ICD-10-CM

## 2024-05-03 PROCEDURE — 99214 OFFICE O/P EST MOD 30 MIN: CPT

## 2024-05-03 RX ORDER — QUETIAPINE FUMARATE 400 MG/1
400 TABLET ORAL
Qty: 90 | Refills: 0 | Status: ACTIVE | COMMUNITY
Start: 2020-10-06 | End: 1900-01-01

## 2024-05-03 RX ORDER — QUETIAPINE FUMARATE 100 MG/1
100 TABLET ORAL
Qty: 90 | Refills: 0 | Status: ACTIVE | COMMUNITY
Start: 2022-06-21 | End: 1900-01-01

## 2024-05-03 RX ORDER — TOPIRAMATE 50 MG/1
TABLET, FILM COATED ORAL
Refills: 0 | Status: ACTIVE | COMMUNITY

## 2024-05-03 RX ORDER — ZOLPIDEM TARTRATE 5 MG/1
5 TABLET ORAL
Qty: 30 | Refills: 2 | Status: ACTIVE | COMMUNITY
Start: 2020-12-14 | End: 1900-01-01

## 2024-05-03 RX ORDER — SEMAGLUTIDE 1.34 MG/ML
2 INJECTION, SOLUTION SUBCUTANEOUS
Refills: 0 | Status: DISCONTINUED | COMMUNITY
End: 2024-05-03

## 2024-05-03 NOTE — PAST MEDICAL HISTORY
[FreeTextEntry1] : 11/28/22 w PCP Bautista Copeland DO - [facility]  "Mild heart valve problem",  partial thyroidectomy, had breast augmentation.  Had another movement d/o specialist at [facility] - Stefanie Lou.   Pt reports that she was told that serroquel is just as good as clozapine for movement.  Was told that botox is tx of choice but did not help. . ulceraterative proctitis  and used a suppository.  dx w osteoarthritis.   Saw movement d/o specialist 2/2023  -  Sonal Acosta MD - [facility].  Clonazepam has been helpful for TD.

## 2024-05-03 NOTE — HISTORY OF PRESENT ILLNESS
[No] : no [FreeTextEntry1] : Past 3 months:  "Good."  Likes therapy at [facility] with a therapist who does CBT-P.   Sleep is good.  Uses zolpidem 80% of nights.  Still Takes gabapentin 200 mg in daniel "occasionally" for hand pain.  clonazepam 0.5 q am still helpful  for TD.   Has not been going to [facility].  No SI.    ozempic was working but insurance d/c it.  Was rx topiramate ? dose by Endo.   [FreeTextEntry2] : Dx with Schizophrenia 15 years ago, when she experienced auditory hallucinations, negative symptoms, and somatic delusions. During that time she heard voices that said negative things about her. She briefly sought treatment at [facility], but within a few months started treatment at [place] through their research program for Schizophrenia. There, she received treatment for 13 years where she engaged in individual therapy and medication management. She has never been psychiatrically hospitalized and has responded well to medications. She was previously on Risperidone . Around 2018 she started to have symptoms of tardive dyskinesia, specifically related to involuntary movements with her neck. Her medication was switched in response to the tardive dyskinesia.  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  Plan was to change from artane to baclofen.  Has AH approx 5 % of time she is awake.  "Rumbling under feet" x years.  Thinks tingling under feet and up legs may be a psychotic sx.  Has not talked to neurologists about tingling under feet.  Prior psychiatrist thought it was sx of psychosis.  Had 2 month trial of metformin from PCP that did not help w wt loss.   Has dry mouth x "several years."  Pt discussed dry mouth and cavities w a dentist in the past.  was given a biotene oral rinse by dentist.  Saw a new movement disorder specialist 5/2/2023 as wanted to try something other than botox - PAMELA Acosta MD - rx  clonazepam 0.5 mg q am.     [FreeTextEntry3] : [facility] intake 8/5/2019.

## 2024-05-03 NOTE — DISCUSSION/SUMMARY
[FreeTextEntry1] : 62 yo woman diagnosed with Schizophrenia 15 years ago, when she experienced auditory hallucinations, negative symptoms, and somatic delusions. At this time, she still experiences auditory hallucinations 1-2 times per day, but has worked on ignoring them so they don't cause as much distress. She recognizes her tendency to isolate, so she often makes sure she socializes and gets out of the house (e.g., going to concerts, attending an art group). While she no longer has living family, she does rely on a couple of close friends for emotional support. She denies suicidal and homicidal ideation.  She has good insight into her illness and access to support and coping strategies.  Dramatic reduction in abnormal movements - almost none remain - on higher dose of baclofen.  She is interested in possible ariprazole trial for metabolic benefits.  Sx increased 7/2020 and quetiapine dose inc from 400 to 500 has led to a resolution.  patient decreased quetiapine from 500 to 400 as well as q 2mos visits for financial reasons.  Insisting on qhs zolpidem despite my warnings re dependence.  Used her PCP to obtain these meds despite my asking her not to.  Rediscussed possibility of clozapine to possibly help movement d/o but pt not interested due to frequency of CBC.  We discussed TD meds but she has researched and too expensive.  I provided pt w patient handout from S&P on treating dry mouth to help combat cavity formation.  I re-suggested clozapine but she is not interested despite possible benefit for TD.  Pt believes that her movement disorder is a reaction to not giving her attention to AH.  Does not think TD has been caused by anti-psychotic medications.  AIMS done = 6  deferred - Will plan to restart conversation around change of SGA. This is a difficult issue as pt's TD is best managed by quetiapine or clozapine as confirmed by movement d/o specialist.   Patients with BMI = >30 on one or more Antipsychotic Meds: 1. Discussed with patient that BMI meets criteria for obesity - 11/3/23 2. Provided psycho education regarding risks associated with obesity includes metabolic syndrome. - 11/3/23 3. Reviewed trend of BMI with patient 11/3/23 4. Made recommendations for intervention to promote weight loss - ozempic [ ] Encouraged physical activity, healthy diet, offered a referral for a nutritionist [ ] Offered med options to mitigate risk of further weight gain and to promote weight loss.  Pt was "encouraged to take quetiapine by a movement d/o specialist".  Was on risperidone for many years prior to this.  Possibly had a different AP when first dx.  Pt is open to a more wt neutral SGA but states needs to be a generic. Not interested in clozapine trial due to CBCs.  We had brief conversation re aripiprazole and pt wants to research this.   Date of Last Physical Exam:  12/2023 -  PCP Bautista Copeland DO - [facility]  Date of Last Annual Labs:  11/2023 - pt will send copies Annual Review of Systems Completed (Y/N): deferred Tobacco Screening Completed (Y/N):  2./23/2023  If Patient is on an antipsychotic Date of Last AIMS: 1/26/2024 Date of last BMI:   5/3/2024 Date of Last HbgA1c:   11/2023 - pt will send copies Date of last Lipid Profile:   11/2023 - pt will send copies  Plan: continue  quetiapine 500 (incr 6/21/22). continue zolpidem 5 qhs  (seen in person 2/23/2023)  *** pt knows that she will not rcv extra meds if uses more than this dose *** clonazepam 0.5 mg q am is rx by [facility] movement disorder specialist Sonal Acosta MD gabapentin 200 mg qhs prn - rx by Neurology next AIMS 1/2025 (pt declines q 6 mos AIMS as sees movement disorder doctors regularly) next BMI 8/2024  next labs 11/2024 next IAP 2/2025 weekly 1:1 w Filipe Braswell NP at [facility] P[facility] clinic  [facility] RT 3 months and prn

## 2024-05-03 NOTE — SOCIAL HISTORY
[No Known Substance Use] : no known substance use [FreeTextEntry1] : ***SocHx: completed her TREASURE at [facility].  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  Pt researched [facility] online - pt prefers to socialize with  people not-identified w mental illness.  Patient participates in various activities on own.  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

## 2024-05-03 NOTE — PHYSICAL EXAM
[Dysarthria] : dysarthria [Constricted] : constricted [Normal] : good [Fair] : fair [0] : 0 [3] : 3 [2] : 2 [No] : No [FreeTextEntry1] : minimal-moderate choreoathetosis in face, neck, shoulders ( upper arms and hands not obsrved on video call.) [FreeTextEntry8] : "Ok"

## 2024-06-01 ENCOUNTER — OUTPATIENT (OUTPATIENT)
Dept: OUTPATIENT SERVICES | Facility: HOSPITAL | Age: 64
LOS: 1 days | Discharge: HOME | End: 2024-06-01

## 2024-07-01 DIAGNOSIS — F43.23 ADJUSTMENT DISORDER WITH MIXED ANXIETY AND DEPRESSED MOOD: ICD-10-CM

## 2024-07-01 DIAGNOSIS — F20.9 SCHIZOPHRENIA, UNSPECIFIED: ICD-10-CM

## 2024-08-09 ENCOUNTER — APPOINTMENT (OUTPATIENT)
Dept: PSYCHIATRY | Facility: CLINIC | Age: 64
End: 2024-08-09

## 2024-08-09 PROBLEM — E66.9 OBESITY (BMI 30-39.9): Status: ACTIVE | Noted: 2024-08-09

## 2024-08-09 PROCEDURE — 99214 OFFICE O/P EST MOD 30 MIN: CPT

## 2024-08-09 PROCEDURE — G2211 COMPLEX E/M VISIT ADD ON: CPT | Mod: NC

## 2024-08-09 NOTE — DISCUSSION/SUMMARY
[FreeTextEntry1] : 65 yo woman diagnosed with Schizophrenia 15 years ago, when she experienced auditory hallucinations, negative symptoms, and somatic delusions. At this time, she still experiences auditory hallucinations 1-2 times per day, but has worked on ignoring them so they don't cause as much distress. She recognizes her tendency to isolate, so she often makes sure she socializes and gets out of the house (e.g., going to concerts, attending an art group). While she no longer has living family, she does rely on a couple of close friends for emotional support. She denies suicidal and homicidal ideation.  She has good insight into her illness and access to support and coping strategies.  Dramatic reduction in abnormal movements - almost none remain - on higher dose of baclofen.  She is interested in possible ariprazole trial for metabolic benefits.  Sx increased 7/2020 and quetiapine dose inc from 400 to 500 has led to a resolution.  patient decreased quetiapine from 500 to 400 as well as q 2mos visits for financial reasons.  Insisting on qhs zolpidem despite my warnings re dependence.  Used her PCP to obtain these meds despite my asking her not to.  Rediscussed possibility of clozapine to possibly help movement d/o but pt not interested due to frequency of CBC.  We discussed TD meds but she has researched and too expensive.  I provided pt w patient handout from S&P on treating dry mouth to help combat cavity formation.  I re-suggested clozapine but she is not interested despite possible benefit for TD.  Pt believes that her movement disorder is a reaction to not giving her attention to AH.  Does not think TD has been caused by anti-psychotic medications.  AIMS done 8/9/24 = 7  deferred - Will plan to restart conversation around change of SGA. This is a difficult issue as pt's TD is best managed by quetiapine or clozapine as confirmed by movement d/o specialist.   Patients with BMI = >30 on one or more Antipsychotic Meds: 1. Discussed with patient that BMI meets criteria for obesity - 11/3/23 2. Provided psycho education regarding risks associated with obesity includes metabolic syndrome. - 11/3/23 3. Reviewed trend of BMI with patient 11/3/23 4. Made recommendations for intervention to promote weight loss - ozempic [ ] Encouraged physical activity, healthy diet, offered a referral for a nutritionist [ ] Offered med options to mitigate risk of further weight gain and to promote weight loss.  Pt was "encouraged to take quetiapine by a movement d/o specialist".  Was on risperidone for many years prior to this.  Possibly had a different AP when first dx.  Pt is open to a more wt neutral SGA but states needs to be a generic. Not interested in clozapine trial due to CBCs.  We had brief conversation re aripiprazole and pt wants to research this.   Date of Last Physical Exam:  12/2023 -  PCP Bautista Copeland DO - [facility]  Date of Last Annual Labs:  11/2023 - pt will send copies Annual Review of Systems Completed (Y/N): deferred Tobacco Screening Completed (Y/N):  2./23/2023  If Patient is on an antipsychotic Date of Last AIMS: 8/8/2024 Date of last BMI:   8/8/2024 Date of Last HbgA1c:   11/2023 - pt will send copies Date of last Lipid Profile:   11/2023 - pt will send copies  Plan: continue  quetiapine 500 (incr 6/21/22). continue zolpidem 5 qhs  (seen in person 2/23/2023)  *** pt knows that she will not rcv extra meds if uses more than this dose *** clonazepam 0.5 mg q am is rx by [facility] movement disorder specialist Sonal Acosta MD gabapentin 200 mg qhs prn - rx by Neurology next AIMS 3/2025 (pt declines q 6 mos AIMS as sees movement disorder doctors regularly) next BMI 11/2024  next labs 11/2024 next IAP 2/2025 weekly 1:1 w Filipe Braswell NP at [facility] PWC clinic  [facility] RT 3 months and prn

## 2024-08-09 NOTE — HISTORY OF PRESENT ILLNESS
[No] : no [FreeTextEntry1] : In person.  Past 3 months:  "Doing better."   TD is less bothersome.  Satisfied with psych med regimen.  Finds therapy at [facility] clinic with a therapist who does CBT-P very helpful.   Sleep is good.  Uses zolpidem 80% of nights.  Still Takes gabapentin 200 mg in daniel "occasionally" for hand pain.  clonazepam 0.5 q am still helpful for TD.   Occasionally goes to [facility].  No SI.   Back on ozempic.  Next PCP in 11/2024.  Movement Disorder specialist 12/2024.   [FreeTextEntry2] : Dx with Schizophrenia 15 years ago, when she experienced auditory hallucinations, negative symptoms, and somatic delusions. During that time she heard voices that said negative things about her. She briefly sought treatment at [facility], but within a few months started treatment at [place] through their research program for Schizophrenia. There, she received treatment for 13 years where she engaged in individual therapy and medication management. She has never been psychiatrically hospitalized and has responded well to medications. She was previously on Risperidone . Around 2018 she started to have symptoms of tardive dyskinesia, specifically related to involuntary movements with her neck. Her medication was switched in response to the tardive dyskinesia.  8/13/19 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  Plan was to change from artane to baclofen.  Has AH approx 5 % of time she is awake.  "Rumbling under feet" x years.  Thinks tingling under feet and up legs may be a psychotic sx.  Has not talked to neurologists about tingling under feet.  Prior psychiatrist thought it was sx of psychosis.  Had 2 month trial of metformin from PCP that did not help w wt loss.   Has dry mouth x "several years."  Pt discussed dry mouth and cavities w a dentist in the past.  was given a biotene oral rinse by dentist.  Saw a new movement disorder specialist 5/2/2023 as wanted to try something other than botox - PAMELA Acosta MD - rx  clonazepam 0.5 mg q am.     [FreeTextEntry3] : [facility] intake 8/5/2019.

## 2024-08-09 NOTE — SOCIAL HISTORY
[No Known Substance Use] : no known substance use [FreeTextEntry1] : ***SocHx: completed her TREASURE at [facility].  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  Pt researched [facility] online - pt prefers to socialize with  people not-identified w mental illness.  Patient participates in various activities on own.  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

## 2024-08-09 NOTE — PHYSICAL EXAM
[Dysarthria] : dysarthria [Constricted] : constricted [Normal] : good [Fair] : fair [1] : 1 [0] : 0 [2] : 2 [3] : 3 [Yes] : Yes [No] : No [FreeTextEntry8] : "Ok" [FreeTextEntry1] : 7

## 2024-08-09 NOTE — PAST MEDICAL HISTORY
[FreeTextEntry1] : 11/28/22 w PCP Bautista Copeland DO - [facility]  "Mild heart valve problem",  partial thyroidectomy, had breast augmentation.  Had another movement d/o specialist at [facility] - Stefanie Lou.   Pt reports that she was told that serroquel is just as good as clozapine for movement.  Was told that botox is tx of choice but did not help. . ulceraterative proctitis  and used a suppository.  dx w osteoarthritis.   Saw movement d/o specialist 2/2023  -  Sonal Acosta MD - [facility].  Clonazepam has been helpful for TD.

## 2024-11-01 ENCOUNTER — APPOINTMENT (OUTPATIENT)
Dept: PSYCHIATRY | Facility: CLINIC | Age: 64
End: 2024-11-01

## 2024-11-22 ENCOUNTER — APPOINTMENT (OUTPATIENT)
Dept: PSYCHIATRY | Facility: CLINIC | Age: 64
End: 2024-11-22
Payer: MEDICARE

## 2024-11-22 VITALS — WEIGHT: 207 LBS | BODY MASS INDEX: 31.37 KG/M2 | HEIGHT: 68 IN

## 2024-11-22 DIAGNOSIS — F20.9 SCHIZOPHRENIA, UNSPECIFIED: ICD-10-CM

## 2024-11-22 PROCEDURE — G2211 COMPLEX E/M VISIT ADD ON: CPT

## 2024-11-22 PROCEDURE — 99214 OFFICE O/P EST MOD 30 MIN: CPT

## 2024-12-01 ENCOUNTER — OUTPATIENT (OUTPATIENT)
Dept: OUTPATIENT SERVICES | Facility: HOSPITAL | Age: 64
LOS: 1 days | Discharge: HOME | End: 2024-12-01

## 2024-12-13 DIAGNOSIS — F43.23 ADJUSTMENT DISORDER WITH MIXED ANXIETY AND DEPRESSED MOOD: ICD-10-CM

## 2024-12-13 DIAGNOSIS — F20.9 SCHIZOPHRENIA, UNSPECIFIED: ICD-10-CM

## 2025-02-07 ENCOUNTER — APPOINTMENT (OUTPATIENT)
Dept: PSYCHIATRY | Facility: CLINIC | Age: 65
End: 2025-02-07

## 2025-02-07 ENCOUNTER — NON-APPOINTMENT (OUTPATIENT)
Age: 65
End: 2025-02-07

## 2025-02-21 ENCOUNTER — NON-APPOINTMENT (OUTPATIENT)
Age: 65
End: 2025-02-21

## 2025-02-21 ENCOUNTER — APPOINTMENT (OUTPATIENT)
Dept: PSYCHIATRY | Facility: CLINIC | Age: 65
End: 2025-02-21
Payer: MEDICARE

## 2025-02-21 VITALS — HEIGHT: 68 IN | BODY MASS INDEX: 31.07 KG/M2 | WEIGHT: 205 LBS

## 2025-02-21 DIAGNOSIS — E11.9 TYPE 2 DIABETES MELLITUS W/OUT COMPLICATIONS: ICD-10-CM

## 2025-02-21 DIAGNOSIS — F20.9 SCHIZOPHRENIA, UNSPECIFIED: ICD-10-CM

## 2025-02-21 DIAGNOSIS — G24.01 DRUG INDUCED SUBACUTE DYSKINESIA: ICD-10-CM

## 2025-02-21 PROCEDURE — G2211 COMPLEX E/M VISIT ADD ON: CPT | Mod: NC

## 2025-02-21 PROCEDURE — 99215 OFFICE O/P EST HI 40 MIN: CPT

## 2025-05-16 ENCOUNTER — APPOINTMENT (OUTPATIENT)
Dept: PSYCHIATRY | Facility: CLINIC | Age: 65
End: 2025-05-16
Payer: MEDICARE

## 2025-05-16 VITALS — WEIGHT: 210 LBS | BODY MASS INDEX: 31.83 KG/M2 | HEIGHT: 68 IN

## 2025-05-16 DIAGNOSIS — F20.9 SCHIZOPHRENIA, UNSPECIFIED: ICD-10-CM

## 2025-05-16 PROCEDURE — G2211 COMPLEX E/M VISIT ADD ON: CPT | Mod: NC

## 2025-05-16 PROCEDURE — 99417 PROLNG OP E/M EACH 15 MIN: CPT

## 2025-05-16 PROCEDURE — 99215 OFFICE O/P EST HI 40 MIN: CPT

## 2025-05-16 RX ORDER — TIRZEPATIDE 10 MG/.5ML
10 INJECTION, SOLUTION SUBCUTANEOUS
Refills: 0 | Status: ACTIVE | COMMUNITY

## 2025-08-01 ENCOUNTER — APPOINTMENT (OUTPATIENT)
Dept: PSYCHIATRY | Facility: CLINIC | Age: 65
End: 2025-08-01
Payer: MEDICARE

## 2025-08-01 VITALS — WEIGHT: 202 LBS | HEIGHT: 68 IN | BODY MASS INDEX: 30.62 KG/M2

## 2025-08-01 DIAGNOSIS — F20.9 SCHIZOPHRENIA, UNSPECIFIED: ICD-10-CM

## 2025-08-01 PROCEDURE — 99214 OFFICE O/P EST MOD 30 MIN: CPT

## 2025-08-01 PROCEDURE — G2211 COMPLEX E/M VISIT ADD ON: CPT | Mod: NC

## 2025-08-01 RX ORDER — VALBENAZINE 40 MG/1
40 CAPSULE ORAL
Refills: 0 | Status: ACTIVE | COMMUNITY